# Patient Record
Sex: MALE | Race: BLACK OR AFRICAN AMERICAN | Employment: OTHER | URBAN - METROPOLITAN AREA
[De-identification: names, ages, dates, MRNs, and addresses within clinical notes are randomized per-mention and may not be internally consistent; named-entity substitution may affect disease eponyms.]

---

## 2021-10-08 ENCOUNTER — APPOINTMENT (OUTPATIENT)
Dept: CT IMAGING | Facility: HOSPITAL | Age: 66
DRG: 377 | End: 2021-10-08
Attending: EMERGENCY MEDICINE
Payer: MEDICARE

## 2021-10-08 ENCOUNTER — HOSPITAL ENCOUNTER (INPATIENT)
Facility: HOSPITAL | Age: 66
LOS: 6 days | Discharge: HOME HEALTH | DRG: 377 | End: 2021-10-14
Attending: EMERGENCY MEDICINE | Admitting: EMERGENCY MEDICINE
Payer: MEDICARE

## 2021-10-08 DIAGNOSIS — E87.6 HYPOKALEMIA: ICD-10-CM

## 2021-10-08 DIAGNOSIS — K92.2 GASTROINTESTINAL HEMORRHAGE, UNSPECIFIED GASTROINTESTINAL HEMORRHAGE TYPE: Primary | ICD-10-CM
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
## 2021-10-08 PROBLEM — E87.1 HYPONATREMIA: Status: ACTIVE | Noted: 2021-10-08

## 2021-10-08 PROBLEM — D64.9 ANEMIA: Status: ACTIVE | Noted: 2021-10-08

## 2021-10-08 PROCEDURE — 99285 EMERGENCY DEPT VISIT HI MDM: CPT

## 2021-10-08 PROCEDURE — 85014 HEMATOCRIT: CPT | Performed by: STUDENT IN AN ORGANIZED HEALTH CARE EDUCATION/TRAINING PROGRAM

## 2021-10-08 PROCEDURE — 80048 BASIC METABOLIC PNL TOTAL CA: CPT | Performed by: EMERGENCY MEDICINE

## 2021-10-08 PROCEDURE — 30233N1 TRANSFUSION OF NONAUTOLOGOUS RED BLOOD CELLS INTO PERIPHERAL VEIN, PERCUTANEOUS APPROACH: ICD-10-PCS | Performed by: INTERNAL MEDICINE

## 2021-10-08 PROCEDURE — 86920 COMPATIBILITY TEST SPIN: CPT

## 2021-10-08 PROCEDURE — 82272 OCCULT BLD FECES 1-3 TESTS: CPT

## 2021-10-08 PROCEDURE — 82962 GLUCOSE BLOOD TEST: CPT

## 2021-10-08 PROCEDURE — 82077 ASSAY SPEC XCP UR&BREATH IA: CPT | Performed by: EMERGENCY MEDICINE

## 2021-10-08 PROCEDURE — 93005 ELECTROCARDIOGRAM TRACING: CPT

## 2021-10-08 PROCEDURE — 96365 THER/PROPH/DIAG IV INF INIT: CPT

## 2021-10-08 PROCEDURE — 83735 ASSAY OF MAGNESIUM: CPT | Performed by: EMERGENCY MEDICINE

## 2021-10-08 PROCEDURE — 85060 BLOOD SMEAR INTERPRETATION: CPT | Performed by: EMERGENCY MEDICINE

## 2021-10-08 PROCEDURE — C9113 INJ PANTOPRAZOLE SODIUM, VIA: HCPCS | Performed by: STUDENT IN AN ORGANIZED HEALTH CARE EDUCATION/TRAINING PROGRAM

## 2021-10-08 PROCEDURE — 99291 CRITICAL CARE FIRST HOUR: CPT

## 2021-10-08 PROCEDURE — 86850 RBC ANTIBODY SCREEN: CPT | Performed by: EMERGENCY MEDICINE

## 2021-10-08 PROCEDURE — 93010 ELECTROCARDIOGRAM REPORT: CPT | Performed by: EMERGENCY MEDICINE

## 2021-10-08 PROCEDURE — 84484 ASSAY OF TROPONIN QUANT: CPT | Performed by: STUDENT IN AN ORGANIZED HEALTH CARE EDUCATION/TRAINING PROGRAM

## 2021-10-08 PROCEDURE — 36430 TRANSFUSION BLD/BLD COMPNT: CPT

## 2021-10-08 PROCEDURE — 84484 ASSAY OF TROPONIN QUANT: CPT | Performed by: EMERGENCY MEDICINE

## 2021-10-08 PROCEDURE — C9113 INJ PANTOPRAZOLE SODIUM, VIA: HCPCS | Performed by: EMERGENCY MEDICINE

## 2021-10-08 PROCEDURE — 96375 TX/PRO/DX INJ NEW DRUG ADDON: CPT

## 2021-10-08 PROCEDURE — 72125 CT NECK SPINE W/O DYE: CPT | Performed by: EMERGENCY MEDICINE

## 2021-10-08 PROCEDURE — 86901 BLOOD TYPING SEROLOGIC RH(D): CPT | Performed by: EMERGENCY MEDICINE

## 2021-10-08 PROCEDURE — 85610 PROTHROMBIN TIME: CPT | Performed by: EMERGENCY MEDICINE

## 2021-10-08 PROCEDURE — 80076 HEPATIC FUNCTION PANEL: CPT | Performed by: EMERGENCY MEDICINE

## 2021-10-08 PROCEDURE — 93010 ELECTROCARDIOGRAM REPORT: CPT | Performed by: STUDENT IN AN ORGANIZED HEALTH CARE EDUCATION/TRAINING PROGRAM

## 2021-10-08 PROCEDURE — 85018 HEMOGLOBIN: CPT | Performed by: STUDENT IN AN ORGANIZED HEALTH CARE EDUCATION/TRAINING PROGRAM

## 2021-10-08 PROCEDURE — 85730 THROMBOPLASTIN TIME PARTIAL: CPT | Performed by: EMERGENCY MEDICINE

## 2021-10-08 PROCEDURE — 70450 CT HEAD/BRAIN W/O DYE: CPT | Performed by: EMERGENCY MEDICINE

## 2021-10-08 PROCEDURE — 85025 COMPLETE CBC W/AUTO DIFF WBC: CPT | Performed by: EMERGENCY MEDICINE

## 2021-10-08 PROCEDURE — 86900 BLOOD TYPING SEROLOGIC ABO: CPT | Performed by: EMERGENCY MEDICINE

## 2021-10-08 RX ORDER — LORAZEPAM 1 MG/1
1 TABLET ORAL
Status: DISCONTINUED | OUTPATIENT
Start: 2021-10-08 | End: 2021-10-14

## 2021-10-08 RX ORDER — LORAZEPAM 2 MG/ML
2 INJECTION INTRAMUSCULAR
Status: DISCONTINUED | OUTPATIENT
Start: 2021-10-08 | End: 2021-10-14

## 2021-10-08 RX ORDER — BISACODYL 10 MG
10 SUPPOSITORY, RECTAL RECTAL
Status: DISCONTINUED | OUTPATIENT
Start: 2021-10-08 | End: 2021-10-14

## 2021-10-08 RX ORDER — SODIUM CHLORIDE 0.9 % (FLUSH) 0.9 %
2 SYRINGE (ML) INJECTION EVERY 8 HOURS
Status: DISCONTINUED | OUTPATIENT
Start: 2021-10-08 | End: 2021-10-14

## 2021-10-08 RX ORDER — SODIUM CHLORIDE 0.9 % (FLUSH) 0.9 %
2 SYRINGE (ML) INJECTION AS NEEDED
Status: DISCONTINUED | OUTPATIENT
Start: 2021-10-08 | End: 2021-10-14

## 2021-10-08 RX ORDER — SODIUM CHLORIDE 9 MG/ML
INJECTION, SOLUTION INTRAVENOUS CONTINUOUS
Status: DISCONTINUED | OUTPATIENT
Start: 2021-10-08 | End: 2021-10-09

## 2021-10-08 RX ORDER — LORAZEPAM 1 MG/1
2 TABLET ORAL
Status: DISCONTINUED | OUTPATIENT
Start: 2021-10-08 | End: 2021-10-14

## 2021-10-08 RX ORDER — ACETAMINOPHEN 500 MG
1000 TABLET ORAL ONCE
Status: COMPLETED | OUTPATIENT
Start: 2021-10-08 | End: 2021-10-08

## 2021-10-08 RX ORDER — ACETAMINOPHEN 500 MG
TABLET ORAL
Status: COMPLETED
Start: 2021-10-08 | End: 2021-10-08

## 2021-10-08 RX ORDER — MULTIPLE VITAMINS W/ MINERALS TAB 9MG-400MCG
1 TAB ORAL DAILY
Status: DISCONTINUED | OUTPATIENT
Start: 2021-10-08 | End: 2021-10-14

## 2021-10-08 RX ORDER — POTASSIUM CHLORIDE 1.5 G/1.77G
40 POWDER, FOR SOLUTION ORAL ONCE
Status: COMPLETED | OUTPATIENT
Start: 2021-10-08 | End: 2021-10-08

## 2021-10-08 RX ORDER — CLONIDINE HYDROCHLORIDE 0.1 MG/1
0.1 TABLET ORAL 3 TIMES DAILY PRN
Status: DISCONTINUED | OUTPATIENT
Start: 2021-10-08 | End: 2021-10-14

## 2021-10-08 RX ORDER — MELATONIN
100 DAILY
Status: DISCONTINUED | OUTPATIENT
Start: 2021-10-09 | End: 2021-10-14

## 2021-10-08 RX ORDER — FOLIC ACID 1 MG/1
1 TABLET ORAL DAILY
Status: DISCONTINUED | OUTPATIENT
Start: 2021-10-08 | End: 2021-10-14

## 2021-10-08 RX ORDER — SODIUM PHOSPHATE, DIBASIC AND SODIUM PHOSPHATE, MONOBASIC 7; 19 G/133ML; G/133ML
1 ENEMA RECTAL ONCE AS NEEDED
Status: DISCONTINUED | OUTPATIENT
Start: 2021-10-08 | End: 2021-10-14

## 2021-10-08 RX ORDER — ACETAMINOPHEN 325 MG/1
650 TABLET ORAL EVERY 6 HOURS PRN
Status: DISCONTINUED | OUTPATIENT
Start: 2021-10-08 | End: 2021-10-14

## 2021-10-08 RX ORDER — POLYETHYLENE GLYCOL 3350 17 G/17G
17 POWDER, FOR SOLUTION ORAL DAILY PRN
Status: DISCONTINUED | OUTPATIENT
Start: 2021-10-08 | End: 2021-10-14

## 2021-10-08 RX ORDER — LORAZEPAM 2 MG/ML
1 INJECTION INTRAMUSCULAR
Status: DISCONTINUED | OUTPATIENT
Start: 2021-10-08 | End: 2021-10-14

## 2021-10-08 RX ORDER — METOCLOPRAMIDE HYDROCHLORIDE 5 MG/ML
10 INJECTION INTRAMUSCULAR; INTRAVENOUS EVERY 8 HOURS PRN
Status: DISCONTINUED | OUTPATIENT
Start: 2021-10-08 | End: 2021-10-14

## 2021-10-08 RX ORDER — ONDANSETRON 2 MG/ML
4 INJECTION INTRAMUSCULAR; INTRAVENOUS EVERY 6 HOURS PRN
Status: DISCONTINUED | OUTPATIENT
Start: 2021-10-08 | End: 2021-10-14

## 2021-10-08 NOTE — ED INITIAL ASSESSMENT (HPI)
Pt presents with c/o neck pain, hematoma to back of the head, bilateral lower extremity pain which pt states is chronic, back pain. Pt states he has had 2 cans of beer today, chronic alcoholic. States he lost consciousness for a little bit.   Now c/o dizz

## 2021-10-08 NOTE — ED NOTES
Orders for admission, patient is aware of plan and ready to go upstairs. Any questions, please call ED MARILU jeffers  at extension [phone].     Type of COVID test sent:rapid  COVID Suspicion level: Low       Titratable drug(s) infusing: blood  Rate: 200    LOC at

## 2021-10-09 ENCOUNTER — ANESTHESIA (OUTPATIENT)
Dept: ENDOSCOPY | Facility: HOSPITAL | Age: 66
DRG: 377 | End: 2021-10-09
Payer: MEDICARE

## 2021-10-09 ENCOUNTER — ANESTHESIA EVENT (OUTPATIENT)
Dept: ENDOSCOPY | Facility: HOSPITAL | Age: 66
DRG: 377 | End: 2021-10-09
Payer: MEDICARE

## 2021-10-09 PROCEDURE — 36591 DRAW BLOOD OFF VENOUS DEVICE: CPT

## 2021-10-09 PROCEDURE — C9113 INJ PANTOPRAZOLE SODIUM, VIA: HCPCS | Performed by: STUDENT IN AN ORGANIZED HEALTH CARE EDUCATION/TRAINING PROGRAM

## 2021-10-09 PROCEDURE — 85025 COMPLETE CBC W/AUTO DIFF WBC: CPT | Performed by: STUDENT IN AN ORGANIZED HEALTH CARE EDUCATION/TRAINING PROGRAM

## 2021-10-09 PROCEDURE — 85014 HEMATOCRIT: CPT | Performed by: STUDENT IN AN ORGANIZED HEALTH CARE EDUCATION/TRAINING PROGRAM

## 2021-10-09 PROCEDURE — 88312 SPECIAL STAINS GROUP 1: CPT | Performed by: INTERNAL MEDICINE

## 2021-10-09 PROCEDURE — 0DB98ZX EXCISION OF DUODENUM, VIA NATURAL OR ARTIFICIAL OPENING ENDOSCOPIC, DIAGNOSTIC: ICD-10-PCS | Performed by: INTERNAL MEDICINE

## 2021-10-09 PROCEDURE — 85018 HEMOGLOBIN: CPT | Performed by: STUDENT IN AN ORGANIZED HEALTH CARE EDUCATION/TRAINING PROGRAM

## 2021-10-09 PROCEDURE — 88305 TISSUE EXAM BY PATHOLOGIST: CPT | Performed by: INTERNAL MEDICINE

## 2021-10-09 PROCEDURE — 83735 ASSAY OF MAGNESIUM: CPT | Performed by: STUDENT IN AN ORGANIZED HEALTH CARE EDUCATION/TRAINING PROGRAM

## 2021-10-09 PROCEDURE — 80307 DRUG TEST PRSMV CHEM ANLYZR: CPT | Performed by: STUDENT IN AN ORGANIZED HEALTH CARE EDUCATION/TRAINING PROGRAM

## 2021-10-09 PROCEDURE — 80053 COMPREHEN METABOLIC PANEL: CPT | Performed by: STUDENT IN AN ORGANIZED HEALTH CARE EDUCATION/TRAINING PROGRAM

## 2021-10-09 PROCEDURE — 84132 ASSAY OF SERUM POTASSIUM: CPT | Performed by: HOSPITALIST

## 2021-10-09 PROCEDURE — 85610 PROTHROMBIN TIME: CPT | Performed by: INTERNAL MEDICINE

## 2021-10-09 RX ORDER — METOCLOPRAMIDE HYDROCHLORIDE 5 MG/ML
10 INJECTION INTRAMUSCULAR; INTRAVENOUS ONCE
Status: DISCONTINUED | OUTPATIENT
Start: 2021-10-09 | End: 2021-10-14

## 2021-10-09 RX ORDER — LIDOCAINE HYDROCHLORIDE 10 MG/ML
INJECTION, SOLUTION EPIDURAL; INFILTRATION; INTRACAUDAL; PERINEURAL AS NEEDED
Status: DISCONTINUED | OUTPATIENT
Start: 2021-10-09 | End: 2021-10-09 | Stop reason: SURG

## 2021-10-09 RX ORDER — PHENYLEPHRINE HCL 10 MG/ML
VIAL (ML) INJECTION AS NEEDED
Status: DISCONTINUED | OUTPATIENT
Start: 2021-10-09 | End: 2021-10-09 | Stop reason: SURG

## 2021-10-09 RX ORDER — NICOTINE 21 MG/24HR
1 PATCH, TRANSDERMAL 24 HOURS TRANSDERMAL DAILY
Status: DISCONTINUED | OUTPATIENT
Start: 2021-10-09 | End: 2021-10-14

## 2021-10-09 RX ADMIN — PHENYLEPHRINE HCL 100 MCG: 10 MG/ML VIAL (ML) INJECTION at 13:40:00

## 2021-10-09 RX ADMIN — LIDOCAINE HYDROCHLORIDE 50 MG: 10 INJECTION, SOLUTION EPIDURAL; INFILTRATION; INTRACAUDAL; PERINEURAL at 13:31:00

## 2021-10-09 NOTE — PROGRESS NOTES
[facility] Hospitalist Progress Note   CC: follow-up hospital admission - gi bleed    SUBJECTIVE:  Interval History:   - he feels well this am aside from ongoing dizziness, light headedness, no room spinning  - reports symptoms started w/ brown stool/diarrhea jamila Appropriate mood and affect.     Data Review:   CBC:   Recent Labs   Lab 10/08/21  1617 10/08/21  2326 10/09/21  0124 10/09/21  0537   WBC 10.2  --   --  5.2   RBC 2.50*  --   --  3.13*   HGB 6.4* 8.3* 8.5* 8.6*   HCT 20.0* 26.5* 26.4* 25.8*   .0  --

## 2021-10-09 NOTE — PLAN OF CARE
Pt NPO am, sleepy but oriented & cooperative, onset of ETOH & nicotine w/drawal symptoms, but CIWA symptoms also due to hunger w/NPO status, per pt. Treated w/antiemetics & ativan, also Nicotine patch after MD notified, pt slept after 2nd dose ativan.  To E FALL  Goal: Free from fall injury  Description: INTERVENTIONS:  - Assess pt frequently for physical needs  - Identify cognitive and physical deficits and behaviors that affect risk of falls.   - [place] fall precautions as indicated by assessment.  - Educ Encourage mobilization and activity  - Obtain nutritional consult as needed  - Establish a toileting routine/schedule  - Consider collaborating with pharmacy to review patient's medication profile  Outcome: Not Progressing  Goal: Maintains adequate nutriti ADULT  Goal: Return mobility to safest level of function  Description: INTERVENTIONS:  - Assess patient stability and activity tolerance for standing, transferring and ambulating w/ or w/o assistive devices  - Assist with transfers and ambulation using saf

## 2021-10-09 NOTE — ED PROVIDER NOTES
Patient Seen in: [facility] AND [facility] Emergency Department      History   Patient presents with:  Trauma  Fall    Stated Complaint:     Subjective:   HPI    78year old male who has a history of daily alcohol use, history of GI bleeding who presents with fa well-developed. He is not ill-appearing, toxic-appearing or diaphoretic. Interventions: Cervical collar in place. Comments: Alert, interacting appropriately, nontoxic appearing   HENT:      Head: Normocephalic and atraumatic.    Eyes:      Conjunc All other components within normal limits   CBC W/ DIFFERENTIAL - Abnormal; Notable for the following components:    RBC 2.50 (*)     HGB 6.4 (*)     HCT 20.0 (*)     MCH 25.6 (*)     RDW-SD 53.1 (*)     RDW 20.2 (*)     Monocyte Absolute 1.31 (*)     All BRAIN OR HEAD (28434)    Result Date: 10/8/2021  CONCLUSION:  1. No acute intracranial process by noncontrast CT technique. 2. Intracranial atherosclerosis. 3. Left craniotomy/craniectomy changes are evident. 4. Lesser incidental findings as above.    Cesar Robb (1,000 mg Oral Given 10/8/21 1826)         Admission disposition: 10/8/2021  6:12 PM           Patient with low hemoglobin, evidence of recurrent GI bleed. Started on Protonix and eventually octreotide as well. Patient was given banana bag.   His potassiu

## 2021-10-09 NOTE — OPERATIVE REPORT
EGD Operative Report    Simón Gomez Patient Status:  Inpatient    1955 MRN [id number]   DILEEP Normal appearing esophageal mucosa. No evidence of Charlene Horowitz Tear however suspect coffee ground emesis due to emetogenic injury. Z-line was regular at 38 cm from the incisors.   Stomach:  Mild portal HTN gastropathy without evidence of old or fresh b

## 2021-10-09 NOTE — H&P
The H&P dated 10/9/21 was reviewed by Magdalene Bence, MD today, the patient was examined and no significant changes have occurred in the patient's condition since the H&P was performed.   I discussed with the patient and/or legal representative the potential

## 2021-10-09 NOTE — ANESTHESIA POSTPROCEDURE EVALUATION
Patient: Davina Tang    Procedure Summary     Date: 10/09/21 Room / Location: [facility] ENDOSCOPY 01 / [facility] ENDOSCOPY    Anesthesia Start: 8675 Anesthesia Stop: 8568    Procedure: ESOPHAGOGASTRODUODENOSCOPY (EGD) (N/A ) Diagnosis: (Duodenitis, portal hyp

## 2021-10-09 NOTE — H&P
[facility] Hospitalist H&P       CC: Patient presents with:  Trauma  Fall       PCP: No primary care provider on file.     History of Present Illness: Patient is a 78year old male with PMH sig for alcohol abuse, history of alcohol withdrawal, anemia complains o conjunctival pallor, EOMs intact. Nose: Nares normal. Septum midline. Mucosa normal. No drainage. Throat: Lips, mucosa, and tongue normal.    Neck: Supple, symmetrical, trachea midline   Lungs:   Clear to auscultation bilaterally.  Normal effort   Ches withdrawal, anemia complains of generalized back pain, neck pain. Patient also reporting hematemesis and melena, concerning for acute GI bleed.      Acute on chronic anemia  Acute blood loss anemia  Hematemesis  Melena  Acute GI bleed  Rule out MW tears  –i

## 2021-10-09 NOTE — PLAN OF CARE
Problem: Patient Centered Care  Goal: Patient preferences are identified and integrated in the patient's plan of care  Description: Interventions:  - What would you like us to know as we care for you?   - Provide timely, complete, and accurate informatio on assessment  - Modify environment to reduce risk of injury  - Provide assistive devices as appropriate  - Consider OT/PT consult to assist with strengthening/mobility  - Encourage toileting schedule  Outcome: Progressing     Problem: DISCHARGE PLANNING antiarrhythmic and heart rate control medications as ordered  - Initiate emergency measures for life threatening arrhythmias  - Monitor electrolytes and administer replacement therapy as ordered  Outcome: Progressing     Problem: GASTROINTESTINAL - ADULT appropriate  - Fluid restriction as ordered  - Instruct patient on fluid and nutrition restrictions as appropriate  Outcome: Progressing  Goal: Hemodynamic stability and optimal renal function maintained  Description: INTERVENTIONS:  - Monitor labs and ass dental floss  - Use electric shaver for shaving  - Use soft bristle tooth brush  - Limit straining and forceful nose blowing  Outcome: Progressing

## 2021-10-09 NOTE — ANESTHESIA PREPROCEDURE EVALUATION
Anesthesia PreOp Note    HPI:     Rebeca Mckeon is a 78year old male who presents for preoperative consultation requested by: Kirstin Harvey MD    Date of Surgery: 10/8/2021 - 10/9/2021    Procedure(s):  ESOPHAGOGASTRODUODENOSCOPY (EGD)  Indication Oral, Q6H PRN, Radha Mcduffie DO, 650 mg at 10/09/21 0925  PEG 3350 (MIRALAX) powder packet 17 g, 17 g, Oral, Daily PRN, Radha Mcduffie,   magnesium hydroxide (MILK OF MAGNESIA) 400 MG/5ML suspension 30 mL, 30 mL, Oral, Daily PRN, Radha Mcduffie,  file.      No Known Allergies    No family history on file.   Social History    Socioeconomic History      Marital status: Single      Spouse name: Not on file      Number of children: Not on file      Years of education: Not on file      Highest education Component Value Date    WBC 5.2 10/09/2021    RBC 3.13 (L) 10/09/2021    HGB 9.5 (L) 10/09/2021    HCT 29.8 (L) 10/09/2021    MCV 82.4 10/09/2021    MCH 27.5 10/09/2021    MCHC 33.3 10/09/2021    RDW 19.2 (H) 10/09/2021    .0 10/09/2021     Lab Re anesthetic management. All of the patient's questions were answered to the best of my ability. The patient desires the anesthetic management as planned.   Argentina Gil MD  10/9/2021 1:06 PM

## 2021-10-09 NOTE — CONSULTS
[facility]    Report of Consultation    Grzegorz Houston Patient Status:  Inpatient    1955 MRN [id number]   Location [facility] ENDOSCOPY LAB SUITES Attending Maribell Mcbride, [address] [facility] Day # 1 PCP No primary care provid Once  Octreotide Acetate (sandoSTATIN) 500 mcg in sodium chloride 0.9% 100 mL infusion, 50 mcg/hr, Intravenous, Continuous  acetaminophen (TYLENOL) tab 650 mg, 650 mg, Oral, Q6H PRN  PEG 3350 (MIRALAX) powder packet 17 g, 17 g, Oral, Daily PRN  magnesium h tender  EXTREMITIES: no edema         Results:     Laboratory Data:  Lab Results   Component Value Date    WBC 5.2 10/09/2021    HGB 9.5 (L) 10/09/2021    HCT 29.8 (L) 10/09/2021    .0 10/09/2021    CREATSERUM 0.76 10/09/2021    BUN 6 (L) 10/09/2021 for cirrhosis will plan on EGD to evaluate for varices, Charlene Horowitz Tear, AVMs, Dieulafoy Lesion and PUD.       Plan:  - EGD with MAC Sedation  - Protonix IV BID  - Keep NPO  - Abdominal US with Dopplers  - [facility] Protocol      Thank you for allowing me to

## 2021-10-09 NOTE — PROGRESS NOTES
Received patient from ED with 1 unit PRBC of 2 infused. Potassium replacement infusing. Started 2nd unit PRBC. Soft BP, NSR on monitor. Patient complains of feeling cold, blankets applied. EKG changes on monitor, physician notified, see orders.  Patient sta

## 2021-10-10 ENCOUNTER — APPOINTMENT (OUTPATIENT)
Dept: ULTRASOUND IMAGING | Facility: HOSPITAL | Age: 66
DRG: 377 | End: 2021-10-10
Attending: INTERNAL MEDICINE
Payer: MEDICARE

## 2021-10-10 PROCEDURE — 93005 ELECTROCARDIOGRAM TRACING: CPT

## 2021-10-10 PROCEDURE — 85014 HEMATOCRIT: CPT | Performed by: STUDENT IN AN ORGANIZED HEALTH CARE EDUCATION/TRAINING PROGRAM

## 2021-10-10 PROCEDURE — P9045 ALBUMIN (HUMAN), 5%, 250 ML: HCPCS | Performed by: INTERNAL MEDICINE

## 2021-10-10 PROCEDURE — 85018 HEMOGLOBIN: CPT | Performed by: STUDENT IN AN ORGANIZED HEALTH CARE EDUCATION/TRAINING PROGRAM

## 2021-10-10 PROCEDURE — 85025 COMPLETE CBC W/AUTO DIFF WBC: CPT | Performed by: INTERNAL MEDICINE

## 2021-10-10 PROCEDURE — C9113 INJ PANTOPRAZOLE SODIUM, VIA: HCPCS | Performed by: STUDENT IN AN ORGANIZED HEALTH CARE EDUCATION/TRAINING PROGRAM

## 2021-10-10 PROCEDURE — 93010 ELECTROCARDIOGRAM REPORT: CPT | Performed by: EMERGENCY MEDICINE

## 2021-10-10 PROCEDURE — 80076 HEPATIC FUNCTION PANEL: CPT | Performed by: INTERNAL MEDICINE

## 2021-10-10 PROCEDURE — 76705 ECHO EXAM OF ABDOMEN: CPT | Performed by: INTERNAL MEDICINE

## 2021-10-10 PROCEDURE — 80048 BASIC METABOLIC PNL TOTAL CA: CPT | Performed by: INTERNAL MEDICINE

## 2021-10-10 RX ORDER — ALBUMIN, HUMAN INJ 5% 5 %
500 SOLUTION INTRAVENOUS ONCE
Status: COMPLETED | OUTPATIENT
Start: 2021-10-10 | End: 2021-10-10

## 2021-10-10 NOTE — PROGRESS NOTES
[place] [facility] - [facility]    Progress Note    Jefferson Swann Patient Status:  Inpatient    1955 MRN [id number]   Location [facility] 2W/SW Attending Hannah Manzano, [address] [facility] [address] Day # 2 PCP No primary care provider on file.      Subjecti 4:44 PM     Finalized by (CST): Yasir Wolf MD on 10/08/2021 at 4:53 PM          EKG 12 Lead    Result Date: 10/10/2021  ECG Report  Interpretation  --------------------------     EKG 12 Lead    Result Date: 10/8/2021  ECG Report  Interpretation  -

## 2021-10-10 NOTE — PLAN OF CARE
Pt continues w/pain posterior head from fall at home, soft lump lower scalp, tender to touch, intact skin. Pt dizzy at times, vertigo, room spinning, other times no symptoms. Pt cooperative & in pleasant spirits, oriented. Ativan x3 for CIWA, good results. for assistance with activity based on assessment  - Modify environment to reduce risk of injury  - Provide assistive devices as appropriate  - Consider OT/PT consult to assist with strengthening/mobility  - Encourage toileting schedule  Outcome: Progressin hydration with IV or PO as ordered and tolerated  - Evaluate effectiveness of GI medications  - Encourage mobilization and activity  - Obtain nutritional consult as needed  - Establish a toileting routine/schedule  - Consider collaborating with pharmacy to and document skin integrity  - Monitor for areas of redness and/or skin breakdown  - Initiate interventions, skin care algorithm/standards of care as needed  Outcome: Progressing     Problem: HEMATOLOGIC - ADULT  Goal: Maintains hematologic stability  Desc

## 2021-10-10 NOTE — PLAN OF CARE
No acute neuro changes overnight, pt A&Ox4, follows commands; can switch between being slightly drowsy at times to restless, CIWAs done and treated per protocol. On RA, tolerating well. VSS.  HR NSR with peaked T waves and ST elevation, EKG done, unchanged non-pharmacological measures as appropriate and evaluate response  - Consider cultural and social influences on pain and pain management  - Manage/alleviate anxiety  - Utilize distraction and/or relaxation techniques  - Monitor for opioid side effects  - N Progressing     Problem: CARDIOVASCULAR - ADULT  Goal: Maintains optimal cardiac output and hemodynamic stability  Description: INTERVENTIONS:  - Monitor vital signs, rhythm, and trends  - Monitor for bleeding, hypotension and signs of decreased cardiac ou medication profile  Outcome: Progressing     Problem: GENITOURINARY - ADULT  Goal: Absence of urinary retention  Description: INTERVENTIONS:  - Assess patient’s ability to void and empty bladder  - Monitor intake/output and perform bladder scan as needed appropriate  Outcome: Progressing     Problem: SKIN/TISSUE INTEGRITY - ADULT  Goal: Skin integrity remains intact  Description: INTERVENTIONS  - Assess and document risk factors for pressure ulcer development  - Assess and document skin integrity  - [place] ADULT  Goal: Return mobility to safest level of function  Description: INTERVENTIONS:  - Assess patient stability and activity tolerance for standing, transferring and ambulating w/ or w/o assistive devices  - Assist with transfers and ambulation using saf

## 2021-10-10 NOTE — PROGRESS NOTES
[facility] Hospitalist Progress Note   CC: follow-up hospital admission - gi bleed    SUBJECTIVE:  Interval History:   - cont to have orthostatic hypotension which he is symptomatic with  - no recurrent vomiting, no stools     OBJECTIVE:  Scheduled Meds:   • tevin 10/10/21  0014 10/10/21  0445 10/10/21  0602 10/10/21  1256   WBC 10.2  --  5.2  --   --   --  7.2  --   --    RBC 2.50*  --  3.13*  --   --   --  3.84  --   --    HGB 6.4*   < > 8.6*   < > 8.0* 9.2* 10.1* 9.0* 9.0*   HCT 20.0*   < > 25.8*   < > 25.1* 28.9 orthostatic hypotension     Shonda Johnson [facility] Hospitalist  Pager: [phone]  Answering Service: [phone]

## 2021-10-11 PROCEDURE — C9113 INJ PANTOPRAZOLE SODIUM, VIA: HCPCS | Performed by: STUDENT IN AN ORGANIZED HEALTH CARE EDUCATION/TRAINING PROGRAM

## 2021-10-11 PROCEDURE — 82607 VITAMIN B-12: CPT | Performed by: INTERNAL MEDICINE

## 2021-10-11 PROCEDURE — 97530 THERAPEUTIC ACTIVITIES: CPT

## 2021-10-11 PROCEDURE — 85025 COMPLETE CBC W/AUTO DIFF WBC: CPT | Performed by: INTERNAL MEDICINE

## 2021-10-11 PROCEDURE — 80048 BASIC METABOLIC PNL TOTAL CA: CPT | Performed by: INTERNAL MEDICINE

## 2021-10-11 PROCEDURE — 97162 PT EVAL MOD COMPLEX 30 MIN: CPT

## 2021-10-11 PROCEDURE — 83921 ORGANIC ACID SINGLE QUANT: CPT | Performed by: INTERNAL MEDICINE

## 2021-10-11 RX ORDER — TRAZODONE HYDROCHLORIDE 50 MG/1
50 TABLET ORAL ONCE
Status: COMPLETED | OUTPATIENT
Start: 2021-10-11 | End: 2021-10-11

## 2021-10-11 RX ORDER — PANTOPRAZOLE SODIUM 40 MG/1
40 TABLET, DELAYED RELEASE ORAL DAILY
Qty: 30 TABLET | Refills: 0 | Status: SHIPPED | OUTPATIENT
Start: 2021-10-11

## 2021-10-11 RX ORDER — MELATONIN
100 DAILY
Qty: 30 TABLET | Refills: 0 | Status: SHIPPED | OUTPATIENT
Start: 2021-10-11

## 2021-10-11 RX ORDER — MULTIPLE VITAMINS W/ MINERALS TAB 9MG-400MCG
1 TAB ORAL DAILY
Qty: 30 TABLET | Refills: 0 | Status: SHIPPED | OUTPATIENT
Start: 2021-10-11

## 2021-10-11 RX ORDER — FOLIC ACID 1 MG/1
1 TABLET ORAL DAILY
Qty: 30 TABLET | Refills: 0 | Status: SHIPPED | OUTPATIENT
Start: 2021-10-11

## 2021-10-11 NOTE — PHYSICAL THERAPY NOTE
PHYSICAL THERAPY EVALUATION - INPATIENT     Room Number: 223/223-A  Evaluation Date: 10/11/2021  Type of Evaluation: Initial   Physician Order: PT Eval and Treat    Presenting Problem: GI hemorrhage; hyponatremia, hypokalemia; anemia; +ETOH; head injury; assistance/support)    PLAN  PT Treatment Plan: Bed mobility; Transfer training; Family education; Gait training; Patient education; Energy conservation; Endurance; Body mechanics;  Coordination; Balance training; Stair training; Stoop training; Sree Bernal Lives With: Alone; Caregiver part-time  Drives: No  Patient Owned Equipment: Rolling walker; Cane       Prior Level of [place]: Pt was independent w/ ADLs. He is mostly homebound. He does not walk much.  He uses cane or furniture walks inside as ne assistance  Distance (ft): 30ft x 1; 2ft x 1  Assistive Device: Rolling walker     Stoop/Curb Assistance: Not tested       Bed Mobility: SBA    Transfers: Min A    Exercise/Education Provided:  Bed mobility  Body mechanics  Gait training  Transfer training

## 2021-10-11 NOTE — DIETARY NOTE
ADULT NUTRITION INITIAL ASSESSMENT    Pt is at moderate nutrition risk. Pt meets moderate malnutrition criteria.       CRITERIA FOR MALNUTRITION DIAGNOSIS:  Criteria for non-severe malnutrition diagnosis: chronic illness related to energy intake less than7 nutrition supplements (ONS) to maximize  Percent Meals Eaten (last 3 days)     Date/Time Percent Meals Eaten (%)    10/09/21 1524 100 %    10/10/21 0845 100 %    10/10/21 1335 50 %    10/11/21 0800 100 %         Food Allergies: No Known Food Allergies (NKF CLASSIFICATION: 19-24.9 kg/m2 - WNL  IBW: 160 lbs        85 % IBW  Usual Body Wt: 115-121 lbs per pt.  [facility] 115# on August 13 2021)        118% UBW    WEIGHT HISTORY:  Patient Weight(s) for the past 336 hrs:   Weight   10/08/21 2120 61.7 kg (136 lb 0.4 oz) able, PO and supplement greater than 75% of needs, return to normal GI function, labs WNL, compliance with diet and compliance with medical plan    DIETITIAN FOLLOW UP: RD to follow and monitor nutrition status    Dilia Guerrero RD, LDN, [address]  ([id number])

## 2021-10-11 NOTE — PLAN OF CARE
Patient AxOx4. VSS. C/o dizziness/light headness, very ortho static, TEDs and ABD binder in use. PT/OT rec HHC at discharge, but does not think patient is safe to leave today. Monitoring CIWAs, patient's have been low.  Patient continent and tolerating PO i relaxation techniques  - Monitor for opioid side effects  - Notify MD/LIP if interventions unsuccessful or patient reports new pain  - Anticipate increased pain with activity and pre-medicate as appropriate  Outcome: Progressing     Problem: SAFETY ADULT - bleeding, hypotension and signs of decreased cardiac output  - Evaluate effectiveness of vasoactive medications to optimize hemodynamic stability  - Monitor arterial and/or venous puncture sites for bleeding and/or hematoma  - Assess quality of pulses, ski contributing to decreased intake, treat as appropriate  - Assist with meals as needed  - Monitor I&O, WT and lab values  - Obtain nutritional consult as needed  - Optimize oral hygiene and moisture  - Encourage food from home; allow for food preferences  - indicated or ordered  - Monitor response to interventions for patient's volume status, including labs, urine output, blood pressure (other measures as available)  - Encourage oral intake as appropriate  - Instruct patient on fluid and nutrition restriction safe patient handling equipment as needed  - Ensure adequate protection for wounds/incisions during mobilization  - Obtain PT/OT consults as needed  - Advance activity as appropriate  - Communicate ordered activity level and limitations with patient/family

## 2021-10-11 NOTE — PLAN OF CARE
Pt was experiencing orthostatic hypotension during day, tried to get him out of bed again before transferring, patient refused stating \"I want to stay here\".  No acute neuro changes, pt A&O x4, follows commands; was agitated at one point during the night, response  - Implement non-pharmacological measures as appropriate and evaluate response  - Consider cultural and social influences on pain and pain management  - Manage/alleviate anxiety  - Utilize distraction and/or relaxation techniques  - Monitor for op system  Outcome: Progressing     Problem: CARDIOVASCULAR - ADULT  Goal: Maintains optimal cardiac output and hemodynamic stability  Description: INTERVENTIONS:  - Monitor vital signs, rhythm, and trends  - Monitor for bleeding, hypotension and signs of dec review patient's medication profile  Outcome: Progressing  Goal: Maintains adequate nutritional intake (undernourished)  Description: INTERVENTIONS:  - Monitor percentage of each meal consumed  - Identify factors contributing to decreased intake, treat as INTERVENTIONS:  - Monitor labs and assess for signs and symptoms of volume excess or deficit  - Monitor intake, output and patient weight  - Monitor urine specific gravity, serum osmolarity and serum sodium as indicated or ordered  - Monitor response to in level of function  Description: INTERVENTIONS:  - Assess patient stability and activity tolerance for standing, transferring and ambulating w/ or w/o assistive devices  - Assist with transfers and ambulation using safe patient handling equipment as needed

## 2021-10-11 NOTE — PROGRESS NOTES
[facility] Hospitalist Progress Note   CC: follow-up hospital admission - gi bleed    SUBJECTIVE:  Interval History:   - no n/v/hematemesis/melena  - still having orthostatic symptoms but improving      OBJECTIVE:  Scheduled Meds:   • nicotine  1 patch Adilia Angel 10/10/21  1256 10/10/21  1819 10/11/21  0440   WBC 10.2  --  5.2  --  7.2  --   --   --  8.5   RBC 2.50*  --  3.13*  --  3.84  --   --   --  3.13*   HGB 6.4*   < > 8.6*   < > 10.1* 9.0* 9.0* 8.6* 8.3*   HCT 20.0*   < > 25.8*   < > 31.4* 27.9* 27.9* 27.4* 2 pt/ot       DVT Prophy: SCD, hold chemical AC     Dispo: possibly home tomorrow pending pt/ot joshua Crowder DO  [facility] Hospitalist  Pager: [phone]  Answering Service: [phone]

## 2021-10-11 NOTE — CM/SW NOTE
BPCI-Advanced Medicare Program Note:  Plan of care reviewed for care coordination and discharge planning. Noted patient falls under  BPCI/Medicare program, with  for gastrointestinal hemorrhage.    PATTI tool was used to help determine next care setti

## 2021-10-11 NOTE — CM/SW NOTE
10/11/21 1200   CM/SW Referral Data   Referral Source    Reason for Referral Discharge planning   Informant Patient   Patient Info   Patient's Current Mental Status at Time of Assessment Alert; Oriented   Patient's Home Environment Condo/Apt

## 2021-10-11 NOTE — BH PROGRESS NOTE
Psych Liaison provided alcohol resources in discharge instructions as discussed with Dr. Evelin Alvarez. Psych Liaison will sign off.      Daniela TURNER LPC

## 2021-10-12 ENCOUNTER — APPOINTMENT (OUTPATIENT)
Dept: MRI IMAGING | Facility: HOSPITAL | Age: 66
DRG: 377 | End: 2021-10-12
Attending: INTERNAL MEDICINE
Payer: MEDICARE

## 2021-10-12 PROCEDURE — 97530 THERAPEUTIC ACTIVITIES: CPT

## 2021-10-12 PROCEDURE — 84100 ASSAY OF PHOSPHORUS: CPT | Performed by: STUDENT IN AN ORGANIZED HEALTH CARE EDUCATION/TRAINING PROGRAM

## 2021-10-12 PROCEDURE — 97116 GAIT TRAINING THERAPY: CPT

## 2021-10-12 PROCEDURE — 85025 COMPLETE CBC W/AUTO DIFF WBC: CPT | Performed by: INTERNAL MEDICINE

## 2021-10-12 PROCEDURE — C9113 INJ PANTOPRAZOLE SODIUM, VIA: HCPCS | Performed by: STUDENT IN AN ORGANIZED HEALTH CARE EDUCATION/TRAINING PROGRAM

## 2021-10-12 PROCEDURE — 97166 OT EVAL MOD COMPLEX 45 MIN: CPT

## 2021-10-12 PROCEDURE — 80048 BASIC METABOLIC PNL TOTAL CA: CPT | Performed by: INTERNAL MEDICINE

## 2021-10-12 PROCEDURE — 83735 ASSAY OF MAGNESIUM: CPT | Performed by: STUDENT IN AN ORGANIZED HEALTH CARE EDUCATION/TRAINING PROGRAM

## 2021-10-12 RX ORDER — MAGNESIUM SULFATE HEPTAHYDRATE 40 MG/ML
2 INJECTION, SOLUTION INTRAVENOUS ONCE
Status: COMPLETED | OUTPATIENT
Start: 2021-10-12 | End: 2021-10-12

## 2021-10-12 RX ORDER — POTASSIUM CHLORIDE 20 MEQ/1
40 TABLET, EXTENDED RELEASE ORAL EVERY 4 HOURS
Status: COMPLETED | OUTPATIENT
Start: 2021-10-12 | End: 2021-10-12

## 2021-10-12 RX ORDER — HYDROXYZINE HYDROCHLORIDE 25 MG/1
25 TABLET, FILM COATED ORAL 3 TIMES DAILY PRN
Status: DISCONTINUED | OUTPATIENT
Start: 2021-10-12 | End: 2021-10-14

## 2021-10-12 RX ORDER — MIDODRINE HYDROCHLORIDE 5 MG/1
5 TABLET ORAL 3 TIMES DAILY
Status: DISCONTINUED | OUTPATIENT
Start: 2021-10-12 | End: 2021-10-14

## 2021-10-12 RX ORDER — POTASSIUM CHLORIDE 20 MEQ/1
40 TABLET, EXTENDED RELEASE ORAL ONCE
Status: DISCONTINUED | OUTPATIENT
Start: 2021-10-12 | End: 2021-10-14

## 2021-10-12 NOTE — PROGRESS NOTES
Patient currently inpatient. Pathology from duodenum with peptic duodenitis with mild villous blunting, mildly increased lymphoplasmacytic infiltrates and Brunner gland hyperplasia.   Should remain on Protonix 40 mg daily for mucosal protection from por

## 2021-10-12 NOTE — PLAN OF CARE
Transfer from [employer]. Pt is A&Ox4. Pt is on RA. Pt on tele, no calls. Pt burping and passing gas. Electrolytes replaced per protocol. Pt complains of headache. PRN Tylenol given. Pt resting in bed comfortably.  Safety measures remain in place, call light with effects  - Notify MD/LIP if interventions unsuccessful or patient reports new pain  - Anticipate increased pain with activity and pre-medicate as appropriate  Outcome: Progressing     Problem: SAFETY ADULT - FALL  Goal: Free from fall injury  Description: output  - Evaluate effectiveness of vasoactive medications to optimize hemodynamic stability  - Monitor arterial and/or venous puncture sites for bleeding and/or hematoma  - Assess quality of pulses, skin color and temperature  - Assess for signs of decrea Assist with meals as needed  - Monitor I&O, WT and lab values  - Obtain nutritional consult as needed  - Optimize oral hygiene and moisture  - Encourage food from home; allow for food preferences  - Enhance eating environment  Outcome: Progressing     Prob patient's volume status, including labs, urine output, blood pressure (other measures as available)  - Encourage oral intake as appropriate  - Instruct patient on fluid and nutrition restrictions as appropriate  Outcome: Progressing     Problem: SKIN/TISSU protection for wounds/incisions during mobilization  - Obtain PT/OT consults as needed  - Advance activity as appropriate  - Communicate ordered activity level and limitations with patient/family  Outcome: Progressing     Problem: [address] [facility]

## 2021-10-12 NOTE — PLAN OF CARE
Skin Note:  Skin was checked with 2 RNs, [employer]. Left tibial bruising. Patient scratched the bruised area on the tibia. Right PIV intact, flushed and saline locked. Attempted to give report to [place] [address]. No answer.

## 2021-10-12 NOTE — PLAN OF CARE
Patient is AOx4, left PIV removed because of infiltration, right PIV placed and flushed/saline locked, one time dose of Trazodone given to aid in sleep.      Problem: Patient Centered Care  Goal: Patient preferences are identified and integrated in the nitin Problem: SAFETY ADULT - FALL  Goal: Free from fall injury  Description: INTERVENTIONS:  - Assess pt frequently for physical needs  - Identify cognitive and physical deficits and behaviors that affect risk of falls.   - [place] fall precautions as indica quality of pulses, skin color and temperature  - Assess for signs of decreased coronary artery perfusion - ex.  Angina  - Evaluate fluid balance, assess for edema, trend weights  Outcome: Progressing  Goal: Absence of cardiac arrhythmias or at baseline  Luis Alfredo preferences  - Enhance eating environment  Outcome: Progressing     Problem: GENITOURINARY - ADULT  Goal: Absence of urinary retention  Description: INTERVENTIONS:  - Assess patient’s ability to void and empty bladder  - Monitor intake/output and perform b restrictions as appropriate  Outcome: Progressing     Problem: SKIN/TISSUE INTEGRITY - ADULT  Goal: Skin integrity remains intact  Description: INTERVENTIONS  - Assess and document risk factors for pressure ulcer development  - Assess and document skin int patient/family  Outcome: Progressing     Problem: NEUROLOGICAL - ADULT  Goal: Achieves maximal functionality and self care  Description: INTERVENTIONS  - Monitor swallowing and airway patency with patient fatigue and changes in neurological status  - Encou

## 2021-10-12 NOTE — PLAN OF CARE
Skin Note. Skin was checked with 2 RNs, "[employer]johnny [facility]. Left tibial bruising. Patient scratched the bruised area on the tibia. Right PIV intact, flushed and saline locked. Meplex to backside no breakdown. Will be present for any questions.    Juno

## 2021-10-12 NOTE — PROGRESS NOTES
[facility] Hospitalist Progress Note   CC: follow-up hospital admission - gi bleed    SUBJECTIVE:  Interval History:   - cont to report persistent dizziness, difficulty ambulating    OBJECTIVE:  Scheduled Meds:   • midodrine  5 mg Oral TID   • potassium chloride Review:   CBC:   Recent Labs   Lab 10/08/21  1617 10/08/21  2326 10/09/21  0537 10/09/21  1150 10/10/21  0445 10/10/21  0445 10/10/21  0602 10/10/21  1256 10/10/21  1819 10/11/21  0440 10/12/21  0942   WBC 10.2  --  5.2  --  7.2  --   --   --   --  8.5 7.9 responsive to fluids, start compression stockings, abdominal binder and increase mobility  - start midodrine  - check MRI Brain to r/o central etiology   - encourage etoh cessation  - check b12 level   - pt/ot      Alcohol abuse  History of alcohol withdra

## 2021-10-12 NOTE — OCCUPATIONAL THERAPY NOTE
OCCUPATIONAL THERAPY EVALUATION - INPATIENT     Room Number: 557/557-A  Evaluation Date: 10/12/2021  Type of Evaluation: Initial  Presenting Problem: gi bleed    Physician Order: IP Consult to Occupational Therapy  Reason for Therapy: ADL/IADL Dysfunction Short Form. Research supports that patients with this level of impairment may benefit from [facility].      DISCHARGE RECOMMENDATIONS  OT Discharge Recommendations: Sub-acute rehabilitation  OT Device Recommendations: TBD    PLAN  OT Treatment Plan: Patient/Famil strength is within functional limits     ACTIVITIES OF DAILY LIVING ASSESSMENT  AM-PAC ‘6-Clicks’ Inpatient Daily Activity Short Form  How much help from another person does the patient currently need…  -   Putting on and taking off regular lower body clot

## 2021-10-12 NOTE — PHYSICAL THERAPY NOTE
PHYSICAL THERAPY TREATMENT NOTE - INPATIENT     Room Number: 215/987-C       Presenting Problem: GI hemorrhage; hyponatremia, hypokalemia; anemia; +ETOH; head injury; fall at home; orthostatic hypotension    Problem List  Principal Problem:    Gastrointest to prior living environment upon D/C from the hospital. Anticipate patient will benefit from continued skilled physical therapy while in the hospital and upon D/C from the hospital at a rehab facility.  The patient's Approx Degree of Impairment: 46.58% has room?: A Little   -   Climbing 3-5 steps with a railing?: A Little     AM-PAC Score:  Raw Score: 18   Approx Degree of Impairment: 46.58%   Standardized Score (AM-PAC Scale): 43.63   CMS Modifier (G-Code): CK    Patient End of Session: Up in chair; Needs m

## 2021-10-12 NOTE — CM/SW NOTE
Notified that PT/OT now recommending [facility]   DON screen requested, Referral sent via [facility]. Will provide list when available    / to remain available for support and/or discharge planning.      Jerson MCCALLA MSN, RN CTL/Case Ma

## 2021-10-13 ENCOUNTER — APPOINTMENT (OUTPATIENT)
Dept: MRI IMAGING | Facility: HOSPITAL | Age: 66
DRG: 377 | End: 2021-10-13
Attending: INTERNAL MEDICINE
Payer: MEDICARE

## 2021-10-13 PROCEDURE — C9113 INJ PANTOPRAZOLE SODIUM, VIA: HCPCS | Performed by: STUDENT IN AN ORGANIZED HEALTH CARE EDUCATION/TRAINING PROGRAM

## 2021-10-13 PROCEDURE — 70551 MRI BRAIN STEM W/O DYE: CPT | Performed by: INTERNAL MEDICINE

## 2021-10-13 PROCEDURE — 99211 OFF/OP EST MAY X REQ PHY/QHP: CPT

## 2021-10-13 RX ORDER — BUTALBITAL, ACETAMINOPHEN AND CAFFEINE 50; 325; 40 MG/1; MG/1; MG/1
1 TABLET ORAL EVERY 4 HOURS PRN
Status: DISCONTINUED | OUTPATIENT
Start: 2021-10-13 | End: 2021-10-14

## 2021-10-13 RX ORDER — CEPHALEXIN 500 MG/1
500 CAPSULE ORAL EVERY 6 HOURS SCHEDULED
Status: DISCONTINUED | OUTPATIENT
Start: 2021-10-13 | End: 2021-10-13

## 2021-10-13 RX ORDER — MECLIZINE HCL 12.5 MG/1
25 TABLET ORAL 3 TIMES DAILY PRN
Status: DISCONTINUED | OUTPATIENT
Start: 2021-10-13 | End: 2021-10-14

## 2021-10-13 NOTE — PROGRESS NOTES
10/13/21 0945   [REMOVED] Wound 10/12/21 Pretibial Left   Final Assessment Date: 10/13/21  Date First Assessed/Time First Assessed: 10/12/21 0900   Location: Pretibial  Wound Location Orientation: Left   Wound Image    Site Assessment Clean;Dry; Intact sisters

## 2021-10-13 NOTE — PROGRESS NOTES
[facility] Hospitalist Progress Note   CC: follow-up hospital admission - gi bleed    SUBJECTIVE:  Interval History:   - c/o persistent room spinning but also w/ light headedness w/ standing    OBJECTIVE:  Scheduled Meds:   • cephalexin  500 mg Oral 4 times per d affect.     Data Review:   CBC:   Recent Labs   Lab 10/08/21  1617 10/08/21  2326 10/09/21  0537 10/09/21  1150 10/10/21  0445 10/10/21  0445 10/10/21  0602 10/10/21  1256 10/10/21  1819 10/11/21  0440 10/12/21  0942   WBC 10.2  --  5.2  --  7.2  --   -- hypotension  - minimally responsive to fluids, started compression stockings, abdominal binder and increase mobility  - started midodrine  - check MRI Brain to r/o central etiology   - encourage etoh cessation  - b12 level okay  - pt/ot      Alcohol abuse

## 2021-10-13 NOTE — PLAN OF CARE
No acute changes. MRI done and MD paged with urgent results. Neurosurgery to come see pt still. Pt still complaining of dizziness and headache-- PRN meds given. Tolerating diet. Up with assit.  Family and pt's  through the [place] updated on plan of needed discharge resources and transportation as appropriate  - Identify discharge learning needs (meds, wound care, etc)  - Arrange for interpreters to assist at discharge as needed  - Consider post-discharge preferences of patient/family/discharge partne ordered antiemetic medications  - Provide nonpharmacologic comfort measures as appropriate  - Advance diet as tolerated, if ordered  - Obtain nutritional consult as needed  - Evaluate fluid balance  Outcome: Progressing  Goal: Maintains or returns to basel

## 2021-10-13 NOTE — CM/SW NOTE
CM f/u on dc planning. CM spoke w/ pt to discuss PT  recs for [facility]. Pt is not agreeable to rec at this time. He \"has things at home he needs to take care of\" like his pets. Pt confirmed he has Vanessa Bowser, k agency, that was set up by the [place].  Pt also conf

## 2021-10-14 ENCOUNTER — APPOINTMENT (OUTPATIENT)
Dept: CT IMAGING | Facility: HOSPITAL | Age: 66
DRG: 377 | End: 2021-10-14
Attending: NEUROLOGICAL SURGERY
Payer: MEDICARE

## 2021-10-14 VITALS
TEMPERATURE: 99 F | DIASTOLIC BLOOD PRESSURE: 56 MMHG | SYSTOLIC BLOOD PRESSURE: 110 MMHG | HEIGHT: 68.9 IN | WEIGHT: 136 LBS | OXYGEN SATURATION: 97 % | HEART RATE: 80 BPM | BODY MASS INDEX: 20.14 KG/M2 | RESPIRATION RATE: 18 BRPM

## 2021-10-14 PROCEDURE — 97116 GAIT TRAINING THERAPY: CPT

## 2021-10-14 PROCEDURE — C9113 INJ PANTOPRAZOLE SODIUM, VIA: HCPCS | Performed by: STUDENT IN AN ORGANIZED HEALTH CARE EDUCATION/TRAINING PROGRAM

## 2021-10-14 PROCEDURE — 70450 CT HEAD/BRAIN W/O DYE: CPT | Performed by: NEUROLOGICAL SURGERY

## 2021-10-14 PROCEDURE — 0031A HC JANSSEN COVID-19 VACCINE ADMIN 1ST DOSE: CPT | Performed by: INTERNAL MEDICINE

## 2021-10-14 PROCEDURE — 97530 THERAPEUTIC ACTIVITIES: CPT

## 2021-10-14 RX ORDER — BACLOFEN 10 MG/1
5 TABLET ORAL 3 TIMES DAILY PRN
Status: DISCONTINUED | OUTPATIENT
Start: 2021-10-14 | End: 2021-10-14

## 2021-10-14 RX ORDER — MECLIZINE HYDROCHLORIDE 25 MG/1
25 TABLET ORAL 3 TIMES DAILY PRN
Qty: 30 TABLET | Refills: 0 | Status: SHIPPED | OUTPATIENT
Start: 2021-10-14

## 2021-10-14 RX ORDER — MIDODRINE HYDROCHLORIDE 5 MG/1
5 TABLET ORAL 3 TIMES DAILY
Qty: 90 TABLET | Refills: 0 | Status: SHIPPED | OUTPATIENT
Start: 2021-10-14

## 2021-10-14 RX ORDER — BUTALBITAL, ACETAMINOPHEN AND CAFFEINE 50; 325; 40 MG/1; MG/1; MG/1
1 TABLET ORAL EVERY 4 HOURS PRN
Qty: 30 TABLET | Refills: 0 | Status: SHIPPED | OUTPATIENT
Start: 2021-10-14

## 2021-10-14 NOTE — PLAN OF CARE
Problem: Patient Centered Care  Goal: Patient preferences are identified and integrated in the patient's plan of care  Description: Interventions:  - What would you like us to know as we care for you?  From home alone  - Provide timely, complete, and accu Assess pt frequently for physical needs  - Identify cognitive and physical deficits and behaviors that affect risk of falls.   - [place] fall precautions as indicated by assessment.  - Educate pt/family on patient safety including physical limitations  - perfusion - ex.  Angina  - Evaluate fluid balance, assess for edema, trend weights  Outcome: Completed  Goal: Absence of cardiac arrhythmias or at baseline  Description: INTERVENTIONS:  - Continuous cardiac monitoring, monitor vital signs, obtain 12 lead EK ADULT  Goal: Absence of urinary retention  Description: INTERVENTIONS:  - Assess patient’s ability to void and empty bladder  - Monitor intake/output and perform bladder scan as needed  - Follow urinary retention protocol/standard of care  - Consider colla Skin integrity remains intact  Description: INTERVENTIONS  - Assess and document risk factors for pressure ulcer development  - Assess and document skin integrity  - Monitor for areas of redness and/or skin breakdown  - Initiate interventions, skin care al and self care  Description: INTERVENTIONS  - Monitor swallowing and airway patency with patient fatigue and changes in neurological status  - Encourage and assist patient to increase activity and self care with guidance from PT/OT  - Encourage visually imp

## 2021-10-14 NOTE — CONSULTS
[place] [facility]    Neurosurgery Consultation    Clarisa Guerrero Patient Status:  Inpatient    1955 MRN [id number]   Location [facility] 5SW/SE Attending Nancy Graf, DO   Hosp Day # 6 PCP No primary care provider on file. magnesium hydroxide (MILK OF MAGNESIA) 400 MG/5ML suspension 30 mL, 30 mL, Oral, Daily PRN  •  bisacodyl (DULCOLAX) rectal suppository 10 mg, 10 mg, Rectal, Daily PRN  •  Fleet Enema (FLEET) 7-19 GM/118ML enema 133 mL, 1 enema, Rectal, Once PRN  •  ondanse GLU 79 10/12/2021    ALB 2.4 (L) 10/10/2021    PTT 24.9 10/08/2021    INR 1.26 (H) 10/09/2021       Imaging:  MRI BRAIN (CPT=70551)    Addendum Date: 10/13/2021    ADDENDUM:  Findings were called to nurse Moses Wheat at  1:12 p.m. On October 13, 2021.    Dictated any issues.     Dao Silva MD  10/14/2021  8:10 AM

## 2021-10-14 NOTE — DISCHARGE SUMMARY
[place] [facility]    Discharge Summary    Zahira Strauss Patient Status:  Inpatient    1955 MRN [id number]   Location [facility] 5SW/SE Attending [facility] Day # 6 PCP No primary care provider on file.      Date of benzos for etoh w/d. Underwent EGD which showed mild portal hypertension gastropathy. Course complicated by persistent dizziness, light headedness, had orthostatic hypotension resistant to volume expansion, was started on midodrine.  He had a fall at home p ESOPHAGOGASTRODUODENOSCOPY (EGD) Theron Varma  [facility] ENDOSCOPY Comp        Pending Labs     Order Current Status    Methylmalonic Acid, Serum In process          Discharge Physical Exam:  Vital Signs:  Blood pressure 110/56, pulse 80, temperature 98.5 °F SEDATIVE/PAIN           Where to Get Your Medications      You can get these medications from any pharmacy    Bring a paper prescription for each of these medications  · butalbital-acetaminophen-caffeine -54 MG Tabs  · folic acid 1 MG Tabs  · meclizi [id number]  Phone:  [phone]    3. [facility]  [address], [address] [place] [address]  Phone:  [phone]    4. Dr. Zulema Childress M.D. [facility]  [address] [place] [address].  [place] [phone]

## 2021-10-14 NOTE — PHYSICAL THERAPY NOTE
PHYSICAL THERAPY TREATMENT NOTE - INPATIENT     Room Number: 557/557-A       Presenting Problem: GI hemorrhage; hyponatremia, hypokalemia; anemia; +ETOH; head injury; fall at home; orthostatic hypotension    Problem List  Principal Problem:    Gastrointest rehabilitation     PLAN  PT Treatment Plan: Bed mobility;Transfer training;Family education;Gait training;Patient education; Energy conservation; Endurance; Body mechanics; Coordination;Balance training;Stoop training;Stair training;Strengthening    SUBJECTIVE addressed; Alarm set;SCDs in place    CURRENT GOALS   Goals to be met by: 10/25/21  Patient Goal Patient's self-stated goal is: not stated   Goal #1 Patient is able to demonstrate supine - sit EOB @ level: modified independent      Goal #1   Current Status

## 2021-10-14 NOTE — PLAN OF CARE
Problem: Patient Centered Care  Goal: Patient preferences are identified and integrated in the patient's plan of care  Description: Interventions:  - What would you like us to know as we care for you?  From home alone  - Provide timely, complete, and accu INTERVENTIONS:  - Assess pt frequently for physical needs  - Identify cognitive and physical deficits and behaviors that affect risk of falls.   - [place] fall precautions as indicated by assessment.  - Educate pt/family on patient safety including physic decreased coronary artery perfusion - ex.  Angina  - Evaluate fluid balance, assess for edema, trend weights  Outcome: Progressing  Goal: Absence of cardiac arrhythmias or at baseline  Description: INTERVENTIONS:  - Continuous cardiac monitoring, monitor vi Problem: GENITOURINARY - ADULT  Goal: Absence of urinary retention  Description: INTERVENTIONS:  - Assess patient’s ability to void and empty bladder  - Monitor intake/output and perform bladder scan as needed  - Follow urinary retention protocol/standar SKIN/TISSUE INTEGRITY - ADULT  Goal: Skin integrity remains intact  Description: INTERVENTIONS  - Assess and document risk factors for pressure ulcer development  - Assess and document skin integrity  - Monitor for areas of redness and/or skin breakdown  - ADULT  Goal: Achieves maximal functionality and self care  Description: INTERVENTIONS  - Monitor swallowing and airway patency with patient fatigue and changes in neurological status  - Encourage and assist patient to increase activity and self care with g

## 2021-10-26 ENCOUNTER — APPOINTMENT (OUTPATIENT)
Dept: GENERAL RADIOLOGY | Facility: HOSPITAL | Age: 66
End: 2021-10-26
Attending: EMERGENCY MEDICINE
Payer: MEDICARE

## 2021-10-26 ENCOUNTER — HOSPITAL ENCOUNTER (EMERGENCY)
Facility: HOSPITAL | Age: 66
Discharge: HOME | End: 2021-10-27
Attending: EMERGENCY MEDICINE
Payer: MEDICARE

## 2021-10-26 DIAGNOSIS — J40 BRONCHITIS: ICD-10-CM

## 2021-10-26 DIAGNOSIS — F10.10 ALCOHOL ABUSE: ICD-10-CM

## 2021-10-26 DIAGNOSIS — R07.89 CHEST PAIN, ATYPICAL: Primary | ICD-10-CM

## 2021-10-26 PROCEDURE — 99285 EMERGENCY DEPT VISIT HI MDM: CPT

## 2021-10-26 PROCEDURE — 93010 ELECTROCARDIOGRAM REPORT: CPT | Performed by: EMERGENCY MEDICINE

## 2021-10-26 PROCEDURE — 93005 ELECTROCARDIOGRAM TRACING: CPT

## 2021-10-26 PROCEDURE — 96374 THER/PROPH/DIAG INJ IV PUSH: CPT

## 2021-10-26 RX ORDER — IPRATROPIUM BROMIDE AND ALBUTEROL SULFATE 2.5; .5 MG/3ML; MG/3ML
3 SOLUTION RESPIRATORY (INHALATION) ONCE
Status: DISCONTINUED | OUTPATIENT
Start: 2021-10-26 | End: 2021-10-26

## 2021-10-27 ENCOUNTER — APPOINTMENT (OUTPATIENT)
Dept: GENERAL RADIOLOGY | Facility: HOSPITAL | Age: 66
End: 2021-10-27
Attending: EMERGENCY MEDICINE
Payer: MEDICARE

## 2021-10-27 VITALS
HEIGHT: 69 IN | HEART RATE: 95 BPM | SYSTOLIC BLOOD PRESSURE: 101 MMHG | RESPIRATION RATE: 20 BRPM | WEIGHT: 115 LBS | DIASTOLIC BLOOD PRESSURE: 68 MMHG | TEMPERATURE: 98 F | BODY MASS INDEX: 17.03 KG/M2 | OXYGEN SATURATION: 100 %

## 2021-10-27 PROCEDURE — 94640 AIRWAY INHALATION TREATMENT: CPT

## 2021-10-27 PROCEDURE — 85025 COMPLETE CBC W/AUTO DIFF WBC: CPT | Performed by: EMERGENCY MEDICINE

## 2021-10-27 PROCEDURE — 71045 X-RAY EXAM CHEST 1 VIEW: CPT | Performed by: EMERGENCY MEDICINE

## 2021-10-27 PROCEDURE — 84484 ASSAY OF TROPONIN QUANT: CPT | Performed by: EMERGENCY MEDICINE

## 2021-10-27 PROCEDURE — 83735 ASSAY OF MAGNESIUM: CPT | Performed by: EMERGENCY MEDICINE

## 2021-10-27 PROCEDURE — 80048 BASIC METABOLIC PNL TOTAL CA: CPT | Performed by: EMERGENCY MEDICINE

## 2021-10-27 RX ORDER — DEXAMETHASONE SODIUM PHOSPHATE 10 MG/ML
10 INJECTION, SOLUTION INTRAMUSCULAR; INTRAVENOUS ONCE
Status: COMPLETED | OUTPATIENT
Start: 2021-10-27 | End: 2021-10-27

## 2021-10-27 RX ORDER — ALBUTEROL SULFATE 90 UG/1
2 AEROSOL, METERED RESPIRATORY (INHALATION) EVERY 4 HOURS PRN
Qty: 18 G | Refills: 0 | Status: SHIPPED | OUTPATIENT
Start: 2021-10-27 | End: 2021-11-26

## 2021-10-27 RX ORDER — IPRATROPIUM BROMIDE AND ALBUTEROL SULFATE 2.5; .5 MG/3ML; MG/3ML
3 SOLUTION RESPIRATORY (INHALATION) ONCE
Status: COMPLETED | OUTPATIENT
Start: 2021-10-27 | End: 2021-10-27

## 2021-10-27 NOTE — ED QUICK NOTES
Pt awake and alert, A&O x4, requesting to leave AMA. Pt received and explained discharge instructions, pt verbalized understanding.  Pt denies any acute distress, requesting to leave states, \"Last time I was here I had a senior care decent room, I can't do [place]

## 2021-10-27 NOTE — Clinical Note
Date: 10/26/2021  Patient: Guillaume Barlow  Admitted: 10/26/2021 11:12 PM  Attending Provider: Tish Naranjo MD    Guillaume Barlow or his authorized caregiver has made the decision for the patient to leave the emergency department against the advice of

## 2021-10-27 NOTE — ED PROVIDER NOTES
Patient Seen in: [facility] AND [facility] Emergency Department    History   Patient presents with:  Alcohol Intoxication    Stated Complaint: ETOH     HPI    30-year-old male with past medical history of alcohol abuse presenting for evaluation approximately 5 marti drinks      Types: 3 Cans of beer per week      Comment: daily     Drug use: Not Currently      Review of Systems :  Constitutional: As per HPI  Respiratory: (+) cough. Cardiovascular: (+) chest pain.   Gastrointestinal: Negative for vomiting and abdominal order CBC With Differential With Platelet.   Procedure                               Abnormality         Status                     ---------                               -----------         ------                     CBC W/ DIFFERENTIAL[[id number]] myself    Evaluation for atypical chest pain associate with shortness of breath/cough and ongoing smoking patient found to be sleeping on initial and repeat evaluations.   ED course with transients consideration for AMA discharge, eventually amenable to shirin followup and re-evaluation.       Medications Prescribed:  Current Discharge Medication List    START taking these medications    albuterol 108 (90 Base) MCG/ACT Inhalation Aero Soln  Inhale 2 puffs into the lungs every 4 (four) hours as needed for Wheezing

## (undated) DEVICE — FORCEP RADIAL JAW 4

## (undated) DEVICE — CONMED SCOPE SAVER BITE BLOCK, 20X27 MM: Brand: SCOPE SAVER

## (undated) DEVICE — MEDI-VAC NON-CONDUCTIVE SUCTION TUBING 6MM X 1.8M (6FT.) L: Brand: CARDINAL HEALTH

## (undated) DEVICE — KIT CLEAN ENDOKIT 1.1OZ GOWNX2

## (undated) DEVICE — LINE MNTR ADLT SET O2 INTMD

## (undated) NOTE — LETTER
[address] [place] [address]      Authorization for Surgical Operation and Procedure     Date:___________                                                                                                         Time:_______ risks that can occur: fever and allergic reactions, hemolytic reactions, transmission of diseases such as Hepatitis, AIDS and Cytomegalovirus (CMV) and fluid overload.   In the event that I wish to have an autologous transfusion of my own blood, or a direct determine when the applicable recovery period ends for purposes of reinstating the DNAR order.   10. Patients having a sterilization procedure: I understand that if the procedure is successful the results will be permanent and it will therefore be impossibl _______________________________________________________________ _____________________________  [place] [employer] Physician)                                                                                         (Date)                                   (Ti